# Patient Record
Sex: MALE | Race: NATIVE HAWAIIAN OR OTHER PACIFIC ISLANDER | HISPANIC OR LATINO | Employment: UNEMPLOYED | URBAN - METROPOLITAN AREA
[De-identification: names, ages, dates, MRNs, and addresses within clinical notes are randomized per-mention and may not be internally consistent; named-entity substitution may affect disease eponyms.]

---

## 2020-12-28 ENCOUNTER — HOSPITAL ENCOUNTER (EMERGENCY)
Age: 30
Discharge: HOME | End: 2020-12-28
Attending: EMERGENCY MEDICINE

## 2020-12-28 ENCOUNTER — APPOINTMENT (OUTPATIENT)
Dept: CT IMAGING | Age: 30
End: 2020-12-28
Attending: STUDENT IN AN ORGANIZED HEALTH CARE EDUCATION/TRAINING PROGRAM

## 2020-12-28 VITALS
DIASTOLIC BLOOD PRESSURE: 86 MMHG | WEIGHT: 154.32 LBS | HEIGHT: 64 IN | OXYGEN SATURATION: 99 % | BODY MASS INDEX: 26.35 KG/M2 | RESPIRATION RATE: 18 BRPM | HEART RATE: 71 BPM | TEMPERATURE: 97.5 F | SYSTOLIC BLOOD PRESSURE: 132 MMHG

## 2020-12-28 DIAGNOSIS — S06.0X0A CONCUSSION WITHOUT LOSS OF CONSCIOUSNESS, INITIAL ENCOUNTER: Primary | ICD-10-CM

## 2020-12-28 PROCEDURE — 99284 EMERGENCY DEPT VISIT MOD MDM: CPT | Performed by: EMERGENCY MEDICINE

## 2020-12-28 PROCEDURE — 70450 CT HEAD/BRAIN W/O DYE: CPT

## 2020-12-28 PROCEDURE — 99283 EMERGENCY DEPT VISIT LOW MDM: CPT

## 2020-12-28 ASSESSMENT — ENCOUNTER SYMPTOMS
CONSTIPATION: 0
TROUBLE SWALLOWING: 0
FEVER: 0
DIZZINESS: 0
EYE PAIN: 0
CHILLS: 0
BACK PAIN: 0
VOMITING: 0
WEAKNESS: 0
COUGH: 0
EYE REDNESS: 0
VOICE CHANGE: 0
SPEECH DIFFICULTY: 0
ABDOMINAL DISTENTION: 0
COLOR CHANGE: 0
HEADACHES: 1
AGITATION: 0
SORE THROAT: 0
ACTIVITY CHANGE: 0
DIARRHEA: 0
SEIZURES: 0
APPETITE CHANGE: 0
WOUND: 0
CHEST TIGHTNESS: 0
FATIGUE: 0
FACIAL ASYMMETRY: 0
WHEEZING: 0
ABDOMINAL PAIN: 0
SHORTNESS OF BREATH: 0
NAUSEA: 0
FACIAL SWELLING: 0

## 2020-12-28 ASSESSMENT — MOVEMENT AND STRENGTH ASSESSMENTS
HEAD_NECK: FULL RANGE OF MOTION
ALL_EXTREMITIES: EQUAL STRENGTH/TONE/MOVEMENT

## 2020-12-28 ASSESSMENT — PAIN SCALES - GENERAL: PAINLEVEL_OUTOF10: 2

## 2024-10-21 ENCOUNTER — OFFICE VISIT (OUTPATIENT)
Facility: CLINIC | Age: 34
End: 2024-10-21
Payer: COMMERCIAL

## 2024-10-21 VITALS
SYSTOLIC BLOOD PRESSURE: 122 MMHG | WEIGHT: 174 LBS | HEIGHT: 66 IN | OXYGEN SATURATION: 97 % | HEART RATE: 71 BPM | BODY MASS INDEX: 27.97 KG/M2 | DIASTOLIC BLOOD PRESSURE: 66 MMHG

## 2024-10-21 DIAGNOSIS — Z23 NEED FOR VACCINATION: ICD-10-CM

## 2024-10-21 DIAGNOSIS — Z00.01 ENCOUNTER FOR GENERAL ADULT MEDICAL EXAMINATION WITH ABNORMAL FINDINGS: Primary | ICD-10-CM

## 2024-10-21 DIAGNOSIS — F43.21 GRIEF: ICD-10-CM

## 2024-10-21 NOTE — PATIENT INSTRUCTIONS
You do not need an appointment. Don't forget to come back fasted for your labs, 8 hours nothing to eat or drink except water. Lab hours are from 8:00 a.m. -4:00 p.m. Monday through Friday.

## 2024-10-21 NOTE — PROGRESS NOTES
Subjective:   Jeramy Galeano is a 34 year old male who presents for Physical (Need note to get back to work )     Patient presents to establish care. Also needs note for work to excuse absence and allow for return. Mother passed away last October. Friend who was big support through mother's passing, passed away Oct 5 2024. Patient needed time to grieve. Patient does work for [employer] as .    Health maintenance:  Is amendable to TDAP and flu      History/Other:    Chief Complaint Reviewed and Verified  Nursing Notes Reviewed and   Verified  Tobacco Reviewed  Allergies Reviewed  Medications Reviewed    Problem List Reviewed  Medical History Reviewed  Surgical History   Reviewed  Family History Reviewed  Social History Reviewed         Tobacco:  He has never smoked tobacco.    No current outpatient medications on file.         Review of Systems:  Review of Systems   Constitutional: Negative.    HENT: Negative.     Eyes: Negative.    Respiratory: Negative.     Cardiovascular: Negative.    Gastrointestinal: Negative.    Genitourinary: Negative.    Musculoskeletal: Negative.    Skin: Negative.    Neurological: Negative.    Psychiatric/Behavioral:  Positive for dysphoric mood (grief).          Objective:   /66   Pulse 71   Ht 5' 6\" (1.676 m)   Wt 174 lb (78.9 kg)   SpO2 97%   BMI 28.08 kg/m²  Estimated body mass index is 28.08 kg/m² as calculated from the following:    Height as of this encounter: 5' 6\" (1.676 m).    Weight as of this encounter: 174 lb (78.9 kg).  Physical Exam  Vitals and nursing note reviewed.   Constitutional:       General: He is not in acute distress.     Appearance: Normal appearance. He is not ill-appearing.   HENT:      Head: Normocephalic and atraumatic.      Right Ear: Tympanic membrane normal. There is no impacted cerumen.      Left Ear: Tympanic membrane normal. There is no impacted cerumen.      Mouth/Throat:      Mouth: Mucous membranes are moist.      Pharynx:  Oropharynx is clear. No oropharyngeal exudate or posterior oropharyngeal erythema.   Eyes:      General:         Right eye: No discharge.         Left eye: No discharge.      Extraocular Movements: Extraocular movements intact.      Pupils: Pupils are equal, round, and reactive to light.   Cardiovascular:      Rate and Rhythm: Normal rate and regular rhythm.      Heart sounds: Normal heart sounds. No murmur heard.     No friction rub. No gallop.   Pulmonary:      Effort: Pulmonary effort is normal.      Breath sounds: Normal breath sounds. No wheezing, rhonchi or rales.   Abdominal:      General: Abdomen is flat. Bowel sounds are normal. There is no distension.      Palpations: Abdomen is soft. There is no mass.      Tenderness: There is no abdominal tenderness. There is no guarding or rebound.   Musculoskeletal:         General: Normal range of motion.      Cervical back: Normal range of motion.      Right lower leg: No edema.      Left lower leg: No edema.   Skin:     General: Skin is warm and dry.      Findings: No rash.   Neurological:      General: No focal deficit present.      Mental Status: He is alert. Mental status is at baseline.   Psychiatric:         Mood and Affect: Mood normal.         Behavior: Behavior normal.           Assessment & Plan:   1. Encounter for general adult medical examination with abnormal findings (Primary)  -     Basic Metabolic Panel (8); Future; Expected date: 10/21/2024  -     Hemoglobin A1C; Future; Expected date: 10/21/2024  -     Lipid Panel; Future; Expected date: 10/21/2024  -     TSH W Reflex To Free T4; Future; Expected date: 10/21/2024  -     CBC With Differential With Platelet    Patient not fasting for labs. To return     2. Grief  -filled out form for patient and wrote letter for work    3. Need for vaccination  -     TdaP (Boostrix) Vaccine (> 7 Y)  -     Fluzone trivalent vaccine, PF 0.5mL, 6mo+ (25676)    -administered by staff      Return if symptoms worsen or fail  to improve.    Shoshana Mejia MD, 10/21/2024, 9:44 AM

## 2024-12-23 ENCOUNTER — LAB ENCOUNTER (OUTPATIENT)
Dept: LAB | Age: 34
End: 2024-12-23
Attending: FAMILY MEDICINE
Payer: COMMERCIAL

## (undated) NOTE — LETTER
Date: 10/21/2024    Patient Name: Jeramy Galeano          To Whom it may concern:    The above patient was seen at [facility] for treatment of a medical condition.    This patient should be excused from attending work from 09/25/2024 through 10/25/2024.    The patient may return to work on 10/25/2024 without restrictions        Sincerely,    Shoshana Mejia MD